# Patient Record
Sex: FEMALE | Race: WHITE
[De-identification: names, ages, dates, MRNs, and addresses within clinical notes are randomized per-mention and may not be internally consistent; named-entity substitution may affect disease eponyms.]

---
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
## 2019-03-26 NOTE — BEGINNING OF VISIT
26-Mar-2019 [Patient] : patient [FreeTextEntry1] : 19 yo here for well visit.Allergies are bad and have had some bad migraines

## 2019-05-27 ENCOUNTER — TRANSCRIPTION ENCOUNTER (OUTPATIENT)
Age: 19
End: 2019-05-27

## 2019-06-18 ENCOUNTER — APPOINTMENT (OUTPATIENT)
Dept: PEDIATRICS | Facility: CLINIC | Age: 19
End: 2019-06-18
Payer: COMMERCIAL

## 2019-06-18 VITALS — TEMPERATURE: 98.4 F | WEIGHT: 115 LBS

## 2019-06-18 DIAGNOSIS — Z87.09 PERSONAL HISTORY OF OTHER DISEASES OF THE RESPIRATORY SYSTEM: ICD-10-CM

## 2019-06-18 LAB — S PYO AG SPEC QL IA: NEGATIVE

## 2019-06-18 PROCEDURE — 99213 OFFICE O/P EST LOW 20 MIN: CPT

## 2019-06-18 PROCEDURE — 87880 STREP A ASSAY W/OPTIC: CPT | Mod: QW

## 2019-06-21 LAB — BACTERIA THROAT CULT: NORMAL

## 2019-07-03 ENCOUNTER — APPOINTMENT (OUTPATIENT)
Dept: PEDIATRICS | Facility: CLINIC | Age: 19
End: 2019-07-03
Payer: COMMERCIAL

## 2019-07-03 VITALS
DIASTOLIC BLOOD PRESSURE: 86 MMHG | WEIGHT: 117.5 LBS | SYSTOLIC BLOOD PRESSURE: 129 MMHG | HEIGHT: 64.25 IN | HEART RATE: 118 BPM | BODY MASS INDEX: 20.06 KG/M2

## 2019-07-03 PROCEDURE — 99395 PREV VISIT EST AGE 18-39: CPT | Mod: 25

## 2019-07-03 PROCEDURE — 92551 PURE TONE HEARING TEST AIR: CPT

## 2019-07-03 PROCEDURE — 96127 BRIEF EMOTIONAL/BEHAV ASSMT: CPT

## 2019-07-03 PROCEDURE — 96160 PT-FOCUSED HLTH RISK ASSMT: CPT | Mod: 59

## 2019-07-03 NOTE — PHYSICAL EXAM
[No Acute Distress] : no acute distress [Alert] : alert [Normocephalic] : normocephalic [EOMI Bilateral] : EOMI bilateral [Atraumatic] : atraumatic [Conjunctivae with no discharge] : conjunctivae with no discharge [PERRLA] : KARELY [Clear tympanic membranes with bony landmarks and light reflex present bilaterally] : clear tympanic membranes with bony landmarks and light reflex present bilaterally  [Auditory Canals Clear] : auditory canals clear [No Excess Tearing] : no excess tearing [No Discharge] : no discharge [Nares Patent] : nares patent [Pink Nasal Mucosa] : pink nasal mucosa [Nonerythematous Oropharynx] : nonerythematous oropharynx [No Caries] : no caries [Supple, full passive range of motion] : supple, full passive range of motion [Uvula Midline] : uvula midline [Palate Intact] : palate intact [No Palpable Masses] : no palpable masses [Symmetric Chest Rise] : symmetric chest rise [Clear to Ausculatation Bilaterally] : clear to auscultation bilaterally [Normoactive Precordium] : normoactive precordium [No Murmurs] : no murmurs [Regular Rate and Rhythm] : regular rate and rhythm [Normal S1, S2 audible] : normal S1, S2 audible [+2 Femoral Pulses] : +2 femoral pulses [Soft] : soft [NonTender] : non tender [Non Distended] : non distended [Normoactive Bowel Sounds] : normoactive bowel sounds [No Hepatomegaly] : no hepatomegaly [No Splenomegaly] : no splenomegaly [Normal Muscle Tone] : normal muscle tone [No Gait Asymmetry] : no gait asymmetry [No Abnormal Lymph Nodes Palpated] : no abnormal lymph nodes palpated [Straight] : straight [No pain or deformities with palpation of bone, muscles, joints] : no pain or deformities with palpation of bone, muscles, joints [No Scoliosis] : no scoliosis [Cranial Nerves Grossly Intact] : cranial nerves grossly intact [No Rash or Lesions] : no rash or lesions [de-identified] : Normal

## 2019-07-03 NOTE — HISTORY OF PRESENT ILLNESS
[Eats meals with family] : eats meals with family [Up to date] : Up to date [Normal] : normal [Has family members/adults to turn to for help] : has family members/adults to turn to for help [Is permitted and is able to make independent decisions] : Is permitted and is able to make independent decisions [Yes] : Patient has had sexual intercourse. [Always] : Condom use: always [HIV Screening Declined] : HIV Screening Declined [Displays self-confidence] : displays self-confidence [Has ways to cope with stress] : has ways to cope with stress [With Teen] : teen [Sleep Concerns] : no sleep concerns [Uses tobacco] : does not use tobacco [Exposure to electronic nicotine delivery system] : no exposure to electronic nicotine delivery system [Uses electronic nicotine delivery system] : does not use electronic nicotine delivery system [Exposure to tobacco] : no exposure to tobacco [Uses drugs] : does not use drugs  [Exposure to drugs] : no exposure to drugs [Drinks alcohol] : does not drink alcohol [Exposure to alcohol] : no exposure to alcohol [Gets depressed, anxious, or irritable/has mood swings] : does not get depressed, anxious, or irritable/has mood swings [Has thought about hurting self or considered suicide] : has not thought about hurting self or considered suicide [Has problems with sleep] : does not have problems with sleep

## 2020-01-03 ENCOUNTER — RX RENEWAL (OUTPATIENT)
Age: 20
End: 2020-01-03

## 2020-01-03 DIAGNOSIS — Z91.018 ALLERGY TO OTHER FOODS: ICD-10-CM

## 2020-06-27 ENCOUNTER — TRANSCRIPTION ENCOUNTER (OUTPATIENT)
Age: 20
End: 2020-06-27

## 2020-06-30 ENCOUNTER — APPOINTMENT (OUTPATIENT)
Dept: FAMILY MEDICINE | Facility: CLINIC | Age: 20
End: 2020-06-30
Payer: COMMERCIAL

## 2020-06-30 ENCOUNTER — LABORATORY RESULT (OUTPATIENT)
Age: 20
End: 2020-06-30

## 2020-06-30 VITALS
OXYGEN SATURATION: 99 % | WEIGHT: 120 LBS | DIASTOLIC BLOOD PRESSURE: 76 MMHG | HEART RATE: 113 BPM | BODY MASS INDEX: 20.49 KG/M2 | HEIGHT: 64 IN | SYSTOLIC BLOOD PRESSURE: 118 MMHG

## 2020-06-30 DIAGNOSIS — Z83.49 FAMILY HISTORY OF OTHER ENDOCRINE, NUTRITIONAL AND METABOLIC DISEASES: ICD-10-CM

## 2020-06-30 PROCEDURE — 99385 PREV VISIT NEW AGE 18-39: CPT | Mod: 25

## 2020-06-30 PROCEDURE — 36415 COLL VENOUS BLD VENIPUNCTURE: CPT

## 2020-06-30 RX ORDER — ACETAMINOPHEN, ASPIRIN, AND CAFFEINE 250; 250; 65 MG/1; MG/1; MG/1
250-250-65 TABLET, FILM COATED ORAL
Refills: 0 | Status: DISCONTINUED | COMMUNITY
End: 2020-06-30

## 2020-06-30 RX ORDER — NORETHINDRONE ACETATE AND ETHINYL ESTRADIOL AND FERROUS FUMARATE 1MG-20(21)
1-20 KIT ORAL
Qty: 28 | Refills: 0 | Status: DISCONTINUED | COMMUNITY
Start: 2018-03-27 | End: 2020-06-30

## 2020-06-30 RX ORDER — AMOXICILLIN 875 MG/1
875 TABLET, FILM COATED ORAL
Qty: 20 | Refills: 0 | Status: DISCONTINUED | COMMUNITY
Start: 2019-06-18 | End: 2020-06-30

## 2020-06-30 NOTE — HEALTH RISK ASSESSMENT
[Excellent] : ~his/her~  mood as  excellent [No falls in past year] : Patient reported no falls in the past year [Yes] : Yes [] : No [0] : 2) Feeling down, depressed, or hopeless: Not at all (0) [[id number]] : 0 [Patient reported PAP Smear was normal] : Patient reported PAP Smear was normal [With Family] : lives with family [Single] : single [Student] : student [PapSmearDate] : 2019 [Fully functional (using the telephone, shopping, preparing meals, housekeeping, doing laundry, using] : Fully functional and needs no help or supervision to perform IADLs (using the telephone, shopping, preparing meals, housekeeping, doing laundry, using transportation, managing medications and managing finances) [Fully functional (bathing, dressing, toileting, transferring, walking, feeding)] : Fully functional (bathing, dressing, toileting, transferring, walking, feeding) [de-identified] : [facility] and [facility]

## 2020-06-30 NOTE — HISTORY OF PRESENT ILLNESS
[de-identified] : 20 year old female here to establish care and for a full physical examination. The patients complete medical, family and social history was documented and reviewed with the patient.  The patients medications were identified and also reviewed with the patient as well as any allergies to any medications. All active and previous medical problems were identified and discussed with the patient.  Any new problems were documented. Patient is feeling well today.\par transitioning from pediatrician

## 2020-06-30 NOTE — PHYSICAL EXAM
[Well Nourished] : well nourished [Well Developed] : well developed [Clear to Auscultation] : lungs were clear to auscultation bilaterally [Normal Gait] : normal gait [Normal S1, S2] : normal S1 and S2 [Regular Rhythm] : with a regular rhythm [Normal Affect] : the affect was normal [Normal Insight/Judgement] : insight and judgment were intact

## 2020-06-30 NOTE — ASSESSMENT
[FreeTextEntry1] : Patient was counseled on healthy eating habits, on daily exercise and stress relief. All medications and allergies were reviewed with the patient and any adjustments necessary were made. Patient was counseled to try engage in an exercise activity for at least 30 minutes 3-4 times a week.  Patient was advised to eat a diet low in fat and carbohydrates and high in protein, with plenty of vegetables. Patient was advised to try and engage in relaxing activities whenever possible.\par The patients blood was draw in office and will be followed and assessed for any issues.  Patient was told to return to the office if any issues arise.  Unless otherwise stated, the patient is to continue all other medications as previously prescribed.\par \par left eye stye\par been present for two weeks\par optho

## 2020-07-01 LAB
ALBUMIN SERPL ELPH-MCNC: 4.5 G/DL
ALP BLD-CCNC: 84 U/L
ALT SERPL-CCNC: 12 U/L
ANION GAP SERPL CALC-SCNC: 13 MMOL/L
APPEARANCE: CLEAR
AST SERPL-CCNC: 20 U/L
BASOPHILS # BLD AUTO: 0.12 K/UL
BASOPHILS NFR BLD AUTO: 1.9 %
BILIRUB SERPL-MCNC: 0.2 MG/DL
BILIRUBIN URINE: NEGATIVE
BLOOD URINE: NEGATIVE
BUN SERPL-MCNC: 13 MG/DL
CALCIUM SERPL-MCNC: 9.3 MG/DL
CHLORIDE SERPL-SCNC: 107 MMOL/L
CHOLEST SERPL-MCNC: 152 MG/DL
CHOLEST/HDLC SERPL: 2.7 RATIO
CO2 SERPL-SCNC: 22 MMOL/L
COLOR: NORMAL
CREAT SERPL-MCNC: 0.74 MG/DL
EOSINOPHIL # BLD AUTO: 0.48 K/UL
EOSINOPHIL NFR BLD AUTO: 7.6 %
GLUCOSE QUALITATIVE U: NEGATIVE
GLUCOSE SERPL-MCNC: 93 MG/DL
HCT VFR BLD CALC: 41.6 %
HDLC SERPL-MCNC: 57 MG/DL
HGB BLD-MCNC: 13.2 G/DL
IMM GRANULOCYTES NFR BLD AUTO: 0.2 %
KETONES URINE: NEGATIVE
LDLC SERPL CALC-MCNC: 78 MG/DL
LEUKOCYTE ESTERASE URINE: NEGATIVE
LYMPHOCYTES # BLD AUTO: 2.27 K/UL
LYMPHOCYTES NFR BLD AUTO: 35.8 %
MAN DIFF?: NORMAL
MCHC RBC-ENTMCNC: 30.3 PG
MCHC RBC-ENTMCNC: 31.7 GM/DL
MCV RBC AUTO: 95.6 FL
MONOCYTES # BLD AUTO: 0.38 K/UL
MONOCYTES NFR BLD AUTO: 6 %
NEUTROPHILS # BLD AUTO: 3.08 K/UL
NEUTROPHILS NFR BLD AUTO: 48.5 %
NITRITE URINE: NEGATIVE
PH URINE: 6.5
PLATELET # BLD AUTO: 262 K/UL
POTASSIUM SERPL-SCNC: 4.2 MMOL/L
PROT SERPL-MCNC: 7 G/DL
PROTEIN URINE: NORMAL
RBC # BLD: 4.35 M/UL
RBC # FLD: 12.9 %
SODIUM SERPL-SCNC: 141 MMOL/L
SPECIFIC GRAVITY URINE: 1.02
TRIGL SERPL-MCNC: 83 MG/DL
TSH SERPL-ACNC: 4.69 UIU/ML
UROBILINOGEN URINE: NORMAL
WBC # FLD AUTO: 6.34 K/UL

## 2020-07-02 LAB
THYROGLOB AB SERPL-ACNC: <20 IU/ML
THYROPEROXIDASE AB SERPL IA-ACNC: 103 IU/ML

## 2020-12-21 ENCOUNTER — APPOINTMENT (OUTPATIENT)
Dept: ULTRASOUND IMAGING | Facility: CLINIC | Age: 20
End: 2020-12-21

## 2020-12-21 ENCOUNTER — APPOINTMENT (OUTPATIENT)
Dept: FAMILY MEDICINE | Facility: CLINIC | Age: 20
End: 2020-12-21
Payer: COMMERCIAL

## 2020-12-21 VITALS
WEIGHT: 125 LBS | HEIGHT: 64 IN | HEART RATE: 92 BPM | DIASTOLIC BLOOD PRESSURE: 70 MMHG | OXYGEN SATURATION: 99 % | BODY MASS INDEX: 21.34 KG/M2 | SYSTOLIC BLOOD PRESSURE: 110 MMHG

## 2020-12-21 DIAGNOSIS — H00.019 HORDEOLUM EXTERNUM UNSPECIFIED EYE, UNSPECIFIED EYELID: ICD-10-CM

## 2020-12-21 DIAGNOSIS — E07.9 DISORDER OF THYROID, UNSPECIFIED: ICD-10-CM

## 2020-12-21 PROBLEM — Z87.09 HISTORY OF SORE THROAT: Status: RESOLVED | Noted: 2019-06-18 | Resolved: 2020-12-21

## 2020-12-21 LAB — TSH SERPL-ACNC: 2.22 UIU/ML

## 2020-12-21 PROCEDURE — 99072 ADDL SUPL MATRL&STAF TM PHE: CPT

## 2020-12-21 PROCEDURE — 36415 COLL VENOUS BLD VENIPUNCTURE: CPT

## 2020-12-21 PROCEDURE — 99214 OFFICE O/P EST MOD 30 MIN: CPT | Mod: 25

## 2020-12-21 RX ORDER — EPINEPHRINE 0.15 MG/.3ML
0.15 INJECTION INTRAMUSCULAR
Refills: 0 | Status: DISCONTINUED | COMMUNITY
End: 2020-12-21

## 2020-12-21 NOTE — HEALTH RISK ASSESSMENT
[] : No [Yes] : Yes [No falls in past year] : Patient reported no falls in the past year [0] : 2) Feeling down, depressed, or hopeless: Not at all (0) [[id number]] : 0 [Excellent] : ~his/her~  mood as  excellent [Patient reported PAP Smear was normal] : Patient reported PAP Smear was normal [With Family] : lives with family [Student] : student [Single] : single [Fully functional (bathing, dressing, toileting, transferring, walking, feeding)] : Fully functional (bathing, dressing, toileting, transferring, walking, feeding) [Fully functional (using the telephone, shopping, preparing meals, housekeeping, doing laundry, using] : Fully functional and needs no help or supervision to perform IADLs (using the telephone, shopping, preparing meals, housekeeping, doing laundry, using transportation, managing medications and managing finances) [PapSmearDate] : 2019 [de-identified] : [facility] and [facility]

## 2020-12-21 NOTE — HISTORY OF PRESENT ILLNESS
[de-identified] : 20 year old female is here for a followup visit. Patient is here for medication renewals and for blood work discussion. Medications and allergies were reviewed and assessed.  There has been no new medications since the last visit. Patient is feeling well with no active changes or issues since Her last visit.\par \par transitioning from pediatrician

## 2020-12-21 NOTE — ASSESSMENT
[FreeTextEntry1] : thyroid disorder\par  Blood was drawn to assess levels of TSH and T4. \par tsh elevated last visit, reviewed labs from previous visit, will recheck\par current asymptomatic\par thyroid sonogram\par strong family history, mother and two sisters with thyroid dysfunction\par \par left eye stye\par been present for two weeks\par optho - gave cream, considering surgical removal

## 2020-12-22 LAB
THYROGLOB AB SERPL-ACNC: <20 IU/ML
THYROPEROXIDASE AB SERPL IA-ACNC: 139 IU/ML

## 2021-05-02 ENCOUNTER — TRANSCRIPTION ENCOUNTER (OUTPATIENT)
Age: 21
End: 2021-05-02

## 2021-07-30 RX ORDER — EPINEPHRINE 0.3 MG/.3ML
0.3 INJECTION INTRAMUSCULAR
Qty: 1 | Refills: 1 | Status: ACTIVE | COMMUNITY
Start: 2020-01-03 | End: 1900-01-01

## 2022-02-28 ENCOUNTER — NON-APPOINTMENT (OUTPATIENT)
Age: 22
End: 2022-02-28

## 2022-03-02 ENCOUNTER — APPOINTMENT (OUTPATIENT)
Dept: FAMILY MEDICINE | Facility: CLINIC | Age: 22
End: 2022-03-02
Payer: COMMERCIAL

## 2022-03-02 VITALS
OXYGEN SATURATION: 99 % | WEIGHT: 123 LBS | DIASTOLIC BLOOD PRESSURE: 76 MMHG | BODY MASS INDEX: 21 KG/M2 | SYSTOLIC BLOOD PRESSURE: 110 MMHG | HEIGHT: 64 IN | HEART RATE: 80 BPM

## 2022-03-02 LAB
ALBUMIN SERPL ELPH-MCNC: 4.5 G/DL
ALP BLD-CCNC: 92 U/L
ALT SERPL-CCNC: 14 U/L
ANION GAP SERPL CALC-SCNC: 12 MMOL/L
AST SERPL-CCNC: 19 U/L
BASOPHILS # BLD AUTO: 0.12 K/UL
BASOPHILS NFR BLD AUTO: 2.4 %
BILIRUB SERPL-MCNC: 0.4 MG/DL
BUN SERPL-MCNC: 11 MG/DL
CALCIUM SERPL-MCNC: 9.3 MG/DL
CHLORIDE SERPL-SCNC: 102 MMOL/L
CHOLEST SERPL-MCNC: 135 MG/DL
CO2 SERPL-SCNC: 22 MMOL/L
CREAT SERPL-MCNC: 0.74 MG/DL
EGFR: 117 ML/MIN/1.73M2
EOSINOPHIL # BLD AUTO: 0.29 K/UL
EOSINOPHIL NFR BLD AUTO: 5.9 %
ESTIMATED AVERAGE GLUCOSE: 100 MG/DL
GLUCOSE SERPL-MCNC: 91 MG/DL
HBA1C MFR BLD HPLC: 5.1 %
HCT VFR BLD CALC: 41 %
HDLC SERPL-MCNC: 55 MG/DL
HGB BLD-MCNC: 13.1 G/DL
IMM GRANULOCYTES NFR BLD AUTO: 0.2 %
LDLC SERPL CALC-MCNC: 67 MG/DL
LYMPHOCYTES # BLD AUTO: 1.86 K/UL
LYMPHOCYTES NFR BLD AUTO: 37.6 %
MAN DIFF?: NORMAL
MCHC RBC-ENTMCNC: 30.2 PG
MCHC RBC-ENTMCNC: 32 GM/DL
MCV RBC AUTO: 94.5 FL
MONOCYTES # BLD AUTO: 0.34 K/UL
MONOCYTES NFR BLD AUTO: 6.9 %
NEUTROPHILS # BLD AUTO: 2.33 K/UL
NEUTROPHILS NFR BLD AUTO: 47 %
NONHDLC SERPL-MCNC: 79 MG/DL
PLATELET # BLD AUTO: 254 K/UL
POTASSIUM SERPL-SCNC: 4.4 MMOL/L
PROT SERPL-MCNC: 7 G/DL
RBC # BLD: 4.34 M/UL
RBC # FLD: 13.2 %
SODIUM SERPL-SCNC: 136 MMOL/L
TRIGL SERPL-MCNC: 60 MG/DL
TSH SERPL-ACNC: 3.85 UIU/ML
WBC # FLD AUTO: 4.95 K/UL

## 2022-03-02 PROCEDURE — 36415 COLL VENOUS BLD VENIPUNCTURE: CPT

## 2022-03-02 PROCEDURE — 99395 PREV VISIT EST AGE 18-39: CPT | Mod: 25

## 2022-03-02 NOTE — ASSESSMENT
[FreeTextEntry1] : Patient was counseled on healthy eating habits, on daily exercise and stress relief. All medications and allergies were reviewed with the patient and any adjustments necessary were made. Patient was counseled to try engage in an exercise activity for at least 30 minutes 3-4 times a week.  Patient was advised to eat a diet low in fat and carbohydrates and high in protein, with plenty of vegetables. Patient was advised to try and engage in relaxing activities whenever possible.\par The patients blood was draw in office and will be followed and assessed for any issues.  Patient was told to return to the office if any issues arise.  Unless otherwise stated, the patient is to continue all other medications as previously prescribed.\par \par \par thyroid disorder\par  Blood was drawn to assess levels of TSH and T4. \par tsh elevated last visit, reviewed labs from previous visit, will recheck\par current asymptomatic\par thyroid sonogram\par strong family history, mother and two sisters with thyroid dysfunction\par \par

## 2022-03-02 NOTE — HEALTH RISK ASSESSMENT
[Excellent] : ~his/her~  mood as  excellent [Never] : Never [Yes] : Yes [No falls in past year] : Patient reported no falls in the past year [0] : 2) Feeling down, depressed, or hopeless: Not at all (0) [PHQ-2 Negative - No further assessment needed] : PHQ-2 Negative - No further assessment needed [[id number]] : 0 [With Family] : lives with family [Patient reported PAP Smear was normal] : Patient reported PAP Smear was normal [Employed] : employed [Single] : single [Fully functional (bathing, dressing, toileting, transferring, walking, feeding)] : Fully functional (bathing, dressing, toileting, transferring, walking, feeding) [Fully functional (using the telephone, shopping, preparing meals, housekeeping, doing laundry, using] : Fully functional and needs no help or supervision to perform IADLs (using the telephone, shopping, preparing meals, housekeeping, doing laundry, using transportation, managing medications and managing finances) [PapSmearDate] : 2019 [de-identified] : [employer] [facility] [place] [employer]

## 2022-03-02 NOTE — HISTORY OF PRESENT ILLNESS
[de-identified] : 22 year old female  here for annual well visit. Patient's blood work was drawn and medications reviewed. Patient's past medical history was reviewed, allergies verified and problems were identified and assessed. Patients medications were reviewed. Patient is feeling well with no new or active complaints at this time.\par \par

## 2022-03-03 LAB
THYROGLOB AB SERPL-ACNC: <20 IU/ML
THYROPEROXIDASE AB SERPL IA-ACNC: 105 IU/ML

## 2022-05-04 DIAGNOSIS — Z00.00 ENCOUNTER FOR GENERAL ADULT MEDICAL EXAMINATION W/OUT ABNORMAL FINDINGS: ICD-10-CM

## 2022-09-10 ENCOUNTER — NON-APPOINTMENT (OUTPATIENT)
Age: 22
End: 2022-09-10

## 2023-04-26 ENCOUNTER — NON-APPOINTMENT (OUTPATIENT)
Age: 23
End: 2023-04-26

## 2023-04-30 ENCOUNTER — NON-APPOINTMENT (OUTPATIENT)
Age: 23
End: 2023-04-30

## 2023-05-02 ENCOUNTER — RESULT REVIEW (OUTPATIENT)
Age: 23
End: 2023-05-02

## 2023-05-02 ENCOUNTER — APPOINTMENT (OUTPATIENT)
Dept: OTOLARYNGOLOGY | Facility: CLINIC | Age: 23
End: 2023-05-02
Payer: COMMERCIAL

## 2023-05-02 ENCOUNTER — NON-APPOINTMENT (OUTPATIENT)
Age: 23
End: 2023-05-02

## 2023-05-02 VITALS
BODY MASS INDEX: 20.49 KG/M2 | HEIGHT: 64 IN | DIASTOLIC BLOOD PRESSURE: 82 MMHG | SYSTOLIC BLOOD PRESSURE: 123 MMHG | WEIGHT: 120 LBS | HEART RATE: 84 BPM

## 2023-05-02 DIAGNOSIS — J34.2 DEVIATED NASAL SEPTUM: ICD-10-CM

## 2023-05-02 DIAGNOSIS — J30.1 ALLERGIC RHINITIS DUE TO POLLEN: ICD-10-CM

## 2023-05-02 PROCEDURE — 70486 CT MAXILLOFACIAL W/O DYE: CPT

## 2023-05-02 PROCEDURE — 99204 OFFICE O/P NEW MOD 45 MIN: CPT | Mod: 25

## 2023-05-02 PROCEDURE — 31231 NASAL ENDOSCOPY DX: CPT

## 2023-05-02 NOTE — PROCEDURE
[Recalcitrant Symptoms] : recalcitrant symptoms  [FreeTextEntry6] : Flexible Nasal Endoscopy:\par Right Side:\par -middle meatus looks clear\par -no drainage, discharge, or polyps\par Left Side:\par -middle meatus looks clear\par -no polyps or growths\par -nasopharynx looks clear

## 2023-05-02 NOTE — PHYSICAL EXAM
[Midline] : trachea located in midline position [Normal] : no rashes [FreeTextEntry1] : -septums to the right along the floor but not obstructive  [de-identified] : jayne sit on BOT [de-identified] : class 1 [FreeTextEntry2] : sensations intact. sinuses nontender to percussion

## 2023-05-02 NOTE — REVIEW OF SYSTEMS
[Sneezing] : sneezing [Seasonal Allergies] : seasonal allergies [Post Nasal Drip] : post nasal drip [Nasal Congestion] : nasal congestion [Sense Of Smell Problem] : sense of smell problem [Discolored Nasal Discharge] : discolored nasal discharge [Throat Clearing] : throat clearing [Swelling Neck] : swelling neck [Eyes Itch] : itching of the eyes [Negative] : Heme/Lymph

## 2023-05-02 NOTE — CONSULT LETTER
[Dear  ___] : Dear  [unfilled], [Consult Letter:] : I had the pleasure of evaluating your patient, [unfilled]. [Please see my note below.] : Please see my note below. [Consult Closing:] : Thank you very much for allowing me to participate in the care of this patient.  If you have any questions, please do not hesitate to contact me. [Sincerely,] : Sincerely, [FreeTextEntry1] : Shawn Greenberg MD FACS

## 2023-05-02 NOTE — HISTORY OF PRESENT ILLNESS
[de-identified] : Patient presents stating she has had congestion starting in February 2022. She went to Urgent Care and they didn’t know what was going on so they gave her ABX which helped, but then after finishing the ABX her symptoms came back. She has stuffy nose, clearing of the throat, and puffy eyes. She states she has been living like this since. She states it affects her sleep and when she blows her nose nothing comes out. She states she has been using Flonase, Singulair, and Zyrtec. Patient adds she did get Covid in February 2022 and that was the only time she had it.

## 2023-05-02 NOTE — ASSESSMENT
[FreeTextEntry1] : Reviewed and reconciled medications, allergies, PMHx, PSHx, SocHx, FMHx \par \par Patient presents stating she has had congestion starting in February 2022. She went to Urgent Care and they didn’t know what was going on so they gave her ABX which helped, but then after finishing the ABX her symptoms came back. She has stuffy nose, clearing of the throat, and puffy eyes. She states she has been living like this since. She states it affects her sleep and when she blows her nose nothing comes out. She states she has been using Flonase, Singulair, and Zyrtec. Patient adds she did get Covid in February 2022 and that was the only time she had it.\par \par Physical Exam:\par -tonsils class 1\par -uvula sits on BOT\par -septums to the right along the floor but not obstructive \par \par Flexible Nasal Endoscopy:\par Right Side:\par -middle meatus looks clear\par -no drainage, discharge, or polyps\par Left Side:\par -middle meatus looks clear\par -no polyps or growths\par -nasopharynx looks clear\par \par MiniCat Sinus:\par -polyp in the maxillaries bilaterally \par -thickening of the membranes with mucus\par -ethmoid disease\par -left sphenoid disease\par -frontals look okay but drain pathway a little blocked\par -marco antonio bullosa left middle turbinate\par \par Plan: Flexible Nasal Endoscopy. Results from MiniCAT sent off to radiologist for review. Will FU once we get results back. Continue using the spray for now. Referral to Dr. Chau.

## 2023-06-08 ENCOUNTER — APPOINTMENT (OUTPATIENT)
Dept: OTOLARYNGOLOGY | Facility: AMBULATORY MEDICAL SERVICES | Age: 23
End: 2023-06-08
Payer: COMMERCIAL

## 2023-06-08 ENCOUNTER — RESULT REVIEW (OUTPATIENT)
Age: 23
End: 2023-06-08

## 2023-06-08 DIAGNOSIS — G89.18 OTHER ACUTE POSTPROCEDURAL PAIN: ICD-10-CM

## 2023-06-08 PROCEDURE — 30140 RESECT INFERIOR TURBINATE: CPT | Mod: 50

## 2023-06-08 PROCEDURE — 31295 NSL/SINS NDSC SURG MAX SINS: CPT | Mod: 50

## 2023-06-08 PROCEDURE — 31255 NSL/SINS NDSC W/TOT ETHMDCT: CPT | Mod: 50

## 2023-06-08 PROCEDURE — 30520 REPAIR OF NASAL SEPTUM: CPT

## 2023-06-08 PROCEDURE — 31298 NSL/SINS NDSC SURG FRNT&SPHN: CPT | Mod: 50

## 2023-06-19 ENCOUNTER — APPOINTMENT (OUTPATIENT)
Dept: OTOLARYNGOLOGY | Facility: CLINIC | Age: 23
End: 2023-06-19
Payer: COMMERCIAL

## 2023-06-19 VITALS
BODY MASS INDEX: 21.34 KG/M2 | HEIGHT: 64 IN | WEIGHT: 125 LBS | DIASTOLIC BLOOD PRESSURE: 86 MMHG | HEART RATE: 96 BPM | SYSTOLIC BLOOD PRESSURE: 132 MMHG

## 2023-06-19 PROCEDURE — 99024 POSTOP FOLLOW-UP VISIT: CPT

## 2023-06-19 NOTE — CONSULT LETTER
[Dear  ___] : Dear  [unfilled], [Courtesy Letter:] : I had the pleasure of seeing your patient, [unfilled], in my office today. [Please see my note below.] : Please see my note below. [Referral Closing:] : Thank you very much for seeing this patient.  If you have any questions, please do not hesitate to contact me. [Sincerely,] : Sincerely, [FreeTextEntry3] : Oswald Matta PA-C\par

## 2023-06-19 NOTE — PROCEDURE
[FreeTextEntry1] : Packing removal [FreeTextEntry2] : Post op [FreeTextEntry3] : Procedure: Nasal Packing removal: Risks, benefits, and alternatives were explained to the patient.  The patient gave oral consent to proceed.  Nasopharynx with packing, blood, and mucus which were suctioned clear bilaterally. The patient tolerated the procedure well and states she feels like she can breathe a lot better after procedure.\par

## 2023-06-19 NOTE — HISTORY OF PRESENT ILLNESS
[de-identified] : Patient presents to office s/p bilateral endoscopic frontal sinusotomy with balloon dilatation, a bilateral\par sphenoidotomy with balloon dilatation, a bilateral endoscopic maxillary antrostomy with balloon dilatation, removal of contents, bilateral endoscopic total ethmoidectomy, all done with the [employer] tracking system, a septal reconstruction and a bilateral smr and partial inferior turbinectomy done on 6/8/23. Patient states she finished antibiotics, no longer on pain medications, and has been using saline sprays.

## 2023-06-19 NOTE — ASSESSMENT
[FreeTextEntry1] : Reviewed and reconciled medications, allergies, PMHx, PSHx, SocHx, FMHx.\par \par \par physical exam:\par nasally: packing, blood, and mucus in place\par \par Procedure: Nasal Packing removal: Risks, benefits, and alternatives were explained to the patient.  The patient gave oral consent to proceed.  Nasopharynx with packing, blood, and mucus which were suctioned clear bilaterally. The patient tolerated the procedure well and states she feels like she can breathe a lot better after procedure.\par \par Path:\par Specimen(s) Submitted\par 1  Right ethmoid\par 2  Left ethmoid\par 3  Bilateral turbinates\par 4  Nasal septum, bone and cartilage\par \par Final Diagnosis\par \par \par 1.  Paranasal sinus, right ethmoid, endoscopic sinus surgery\par - Chronic sinusitis and inflammatory polyp\par \par 2.  Paranasal sinus, left ethmoid, endoscopic sinus surgery\par - Chronic sinusitis with tiny fragments of inflammatory polyp\par \par 3.  Nasal cavity, bilateral turbinates, excision\par - Hypertrophic turbinate tissue with chronic inflammation\par \par 4.  Nasal cavity, septum bone and cartilage, septoplasty\par - Cartilage and bone\par \par Plan:\par Follow up in 2 weeks\par Sinus rinse - 1 bottle per day\par \par

## 2023-06-25 ENCOUNTER — APPOINTMENT (OUTPATIENT)
Dept: FAMILY MEDICINE | Facility: CLINIC | Age: 23
End: 2023-06-25

## 2023-07-03 ENCOUNTER — APPOINTMENT (OUTPATIENT)
Dept: OTOLARYNGOLOGY | Facility: CLINIC | Age: 23
End: 2023-07-03
Payer: COMMERCIAL

## 2023-07-03 VITALS
DIASTOLIC BLOOD PRESSURE: 84 MMHG | HEART RATE: 84 BPM | BODY MASS INDEX: 21.34 KG/M2 | WEIGHT: 125 LBS | HEIGHT: 64 IN | SYSTOLIC BLOOD PRESSURE: 129 MMHG

## 2023-07-03 DIAGNOSIS — J34.89 OTHER SPECIFIED DISORDERS OF NOSE AND NASAL SINUSES: ICD-10-CM

## 2023-07-03 PROCEDURE — 99024 POSTOP FOLLOW-UP VISIT: CPT

## 2023-07-03 PROCEDURE — 31231 NASAL ENDOSCOPY DX: CPT

## 2023-07-03 NOTE — ASSESSMENT
[FreeTextEntry1] : Reviewed and reconciled medications, allergies, PMHx, PSHx, SocHx, FMHx.\par \par \par physical exam:\par nasally: packing, blood, and mucus in place\par \par Procedure:  Rigid Nasal Endoscopy w/ Bilateral Sinus Debridement: Risks, benefits, and alternatives of rigid endoscopy were explained to the patient.  The patient gave oral consent to proceed. The 0 degree scope was inserted into the right nasal cavity.  Endoscopy of the inferior and middle meatus was performed. Spheno-ethmoid recess clear.  Nasopharynx was clear.  Turbinates were without mass.The procedure was repeated on the contralateral side with similar findings. Middle meatus and sinus debridement done bilaterally. The patient tolerated the procedure well.\par \par \par Path:\par Specimen(s) Submitted\par 1 Right ethmoid\par 2 Left ethmoid\par 3 Bilateral turbinates\par 4 Nasal septum, bone and cartilage\par \par Final Diagnosis\par \par \par 1. Paranasal sinus, right ethmoid, endoscopic sinus surgery\par - Chronic sinusitis and inflammatory polyp\par \par 2. Paranasal sinus, left ethmoid, endoscopic sinus surgery\par - Chronic sinusitis with tiny fragments of inflammatory polyp\par \par 3. Nasal cavity, bilateral turbinates, excision\par - Hypertrophic turbinate tissue with chronic inflammation\par \par 4. Nasal cavity, septum bone and cartilage, septoplasty\par - Cartilage and bone\par \par Plan:\par Follow up in 4 weeks\par Sinus rinse - 1 bottle per day

## 2023-07-03 NOTE — HISTORY OF PRESENT ILLNESS
[de-identified] : Patient presents to office s/p bilateral endoscopic frontal sinusotomy with balloon dilatation, a bilateral\par sphenoidotomy with balloon dilatation, a bilateral endoscopic maxillary antrostomy with balloon dilatation, removal of contents, bilateral endoscopic total ethmoidectomy, all done with the [employer] GPS tracking system, a septal reconstruction and a bilateral smr and partial inferior turbinectomy done on 6/8/23. Patient states she has been doing well. Has been using the sinus rinse and contents keep coming out but not as much as before.

## 2023-07-03 NOTE — PROCEDURE
[FreeTextEntry1] :  Rigid Nasal Endoscopy w/ Bilateral Sinus Debridement [FreeTextEntry2] : post op [FreeTextEntry3] : Procedure:  Rigid Nasal Endoscopy w/ Bilateral Sinus Debridement: Risks, benefits, and alternatives of rigid endoscopy were explained to the patient.  The patient gave oral consent to proceed. The 0 degree scope was inserted into the right nasal cavity.  Endoscopy of the inferior and middle meatus was performed. Spheno-ethmoid recess clear.  Nasopharynx was clear.  Turbinates were without mass.The procedure was repeated on the contralateral side with similar findings. Middle meatus and sinus debridement done bilaterally. The patient tolerated the procedure well.\par

## 2023-08-04 ENCOUNTER — APPOINTMENT (OUTPATIENT)
Dept: OTOLARYNGOLOGY | Facility: CLINIC | Age: 23
End: 2023-08-04
Payer: COMMERCIAL

## 2023-08-04 VITALS
BODY MASS INDEX: 21.34 KG/M2 | DIASTOLIC BLOOD PRESSURE: 82 MMHG | HEART RATE: 79 BPM | SYSTOLIC BLOOD PRESSURE: 120 MMHG | WEIGHT: 125 LBS | HEIGHT: 64 IN

## 2023-08-04 DIAGNOSIS — J33.9 NASAL POLYP, UNSPECIFIED: ICD-10-CM

## 2023-08-04 DIAGNOSIS — Z98.890 OTHER SPECIFIED POSTPROCEDURAL STATES: ICD-10-CM

## 2023-08-04 PROCEDURE — 31231 NASAL ENDOSCOPY DX: CPT | Mod: 78

## 2023-08-04 PROCEDURE — 99213 OFFICE O/P EST LOW 20 MIN: CPT | Mod: 25

## 2023-08-04 RX ORDER — BUDESONIDE 1 MG/2ML
1 INHALANT ORAL
Qty: 90 | Refills: 3 | Status: ACTIVE | COMMUNITY
Start: 2023-08-04 | End: 1900-01-01

## 2023-08-04 RX ORDER — ACETAMINOPHEN AND CODEINE PHOSPHATE 300; 30 MG/1; MG/1
300-30 TABLET ORAL
Qty: 30 | Refills: 0 | Status: DISCONTINUED | COMMUNITY
Start: 2023-06-08 | End: 2023-08-04

## 2023-08-04 RX ORDER — CEPHALEXIN 500 MG/1
500 TABLET ORAL
Qty: 20 | Refills: 0 | Status: DISCONTINUED | COMMUNITY
Start: 2023-06-08 | End: 2023-08-04

## 2023-08-04 NOTE — ASSESSMENT
[FreeTextEntry1] : Reviewed and Reconciled the pmhx, fmhx, sochx, and medhx  Patient presents to office s/p bilateral endoscopic frontal sinusotomy with balloon dilatation, a bilateral sphenoidotomy with balloon dilatation, a bilateral endoscopic maxillary antrostomy with balloon dilatation, removal of contents, bilateral endoscopic total ethmoidectomy, all done with the [place] GPS tracking system, a septal reconstruction and a bilateral smr and partial inferior turbinectomy done on 6/8/23. Patient states breathing and sinuses good. She continues sinus rinse. Not using any nasal sprays.  Physical Exam: Pathology benign, inflammatory polyp, chronic inflammation Tonsils Type 1 no drip  Nasal endoscopy, rigid sinuses open clear no debris or residual packing b/l   Plan:  Pulmicort prescribed. FU 6 mo

## 2023-08-04 NOTE — PHYSICAL EXAM
[FreeTextEntry1] : sensations intact sinuses nontender to percussion  [Midline] : trachea located in midline position [de-identified] : Tonsils Type 1  [Normal] : orientation to person, place, and time: normal

## 2023-08-04 NOTE — ADDENDUM
[FreeTextEntry1] : Documented by Mi Glaser acting as a scribe for Dr. Greenberg on [mm//dd/yyyy].   All Medical record entries made by the scribe were at my, Dr. Greenberg, direction and personally directed by me on 08/04/2023. I have reviewed the chart and agree that the record accurately reflects my personal performance of the history, physical exam, assessment, and plan. I have also personally directed, reviewed, and agreed with the discharge instructions.

## 2023-08-04 NOTE — PROCEDURE
[Post-Op Patency] : post-op patency [None] : none [FreeTextEntry6] : Nasal endoscopy, rigid sinuses open clear no debris or residual packing b/l

## 2023-08-04 NOTE — HISTORY OF PRESENT ILLNESS
[de-identified] :  Patient presents to office s/p bilateral endoscopic frontal sinusotomy with balloon dilatation, a bilateral sphenoidotomy with balloon dilatation, a bilateral endoscopic maxillary antrostomy with balloon dilatation, removal of contents, bilateral endoscopic total ethmoidectomy, all done with the [place] GPS tracking system, a septal reconstruction and a bilateral smr and partial inferior turbinectomy done on 6/8/23. Patient states breathing and sinuses good. She continues sinus rinse. Not using any nasal sprays.

## 2023-08-04 NOTE — CONSULT LETTER
[Dear  ___] : Dear  [unfilled], [Courtesy Letter:] : I had the pleasure of seeing your patient, [unfilled], in my office today. [Please see my note below.] : Please see my note below. [Consult Closing:] : Thank you very much for allowing me to participate in the care of this patient.  If you have any questions, please do not hesitate to contact me. [Sincerely,] : Sincerely, [FreeTextEntry3] : Shawn Greenberg MD FACS

## 2023-08-08 RX ORDER — FLUTICASONE PROPIONATE 93 UG/1
93 SPRAY, METERED NASAL
Qty: 3 | Refills: 3 | Status: ACTIVE | COMMUNITY
Start: 2023-08-08 | End: 1900-01-01

## 2024-03-13 ENCOUNTER — APPOINTMENT (OUTPATIENT)
Dept: OTOLARYNGOLOGY | Facility: CLINIC | Age: 24
End: 2024-03-13
Payer: COMMERCIAL

## 2024-03-13 VITALS
WEIGHT: 125 LBS | HEART RATE: 78 BPM | SYSTOLIC BLOOD PRESSURE: 124 MMHG | BODY MASS INDEX: 21.34 KG/M2 | HEIGHT: 64 IN | DIASTOLIC BLOOD PRESSURE: 86 MMHG

## 2024-03-13 DIAGNOSIS — R09.81 NASAL CONGESTION: ICD-10-CM

## 2024-03-13 DIAGNOSIS — J01.00 ACUTE MAXILLARY SINUSITIS, UNSPECIFIED: ICD-10-CM

## 2024-03-13 DIAGNOSIS — J31.0 CHRONIC RHINITIS: ICD-10-CM

## 2024-03-13 DIAGNOSIS — R04.0 EPISTAXIS: ICD-10-CM

## 2024-03-13 DIAGNOSIS — J34.89 NASAL CONGESTION: ICD-10-CM

## 2024-03-13 DIAGNOSIS — J32.4 CHRONIC PANSINUSITIS: ICD-10-CM

## 2024-03-13 PROCEDURE — 31231 NASAL ENDOSCOPY DX: CPT

## 2024-03-13 PROCEDURE — 99213 OFFICE O/P EST LOW 20 MIN: CPT | Mod: 25

## 2024-03-13 RX ORDER — AMOXICILLIN AND CLAVULANATE POTASSIUM 500; 125 MG/1; MG/1
500-125 TABLET, FILM COATED ORAL
Qty: 20 | Refills: 0 | Status: ACTIVE | COMMUNITY
Start: 2024-03-13 | End: 1900-01-01

## 2024-03-13 NOTE — ASSESSMENT
[FreeTextEntry1] :  Reviewed and reconciled medications, allergies, PMHx, PSHx, SocHx, FMHx.  Pt with h/o nasal congestion and s/p bilateral endoscopic frontal sinusotomy with balloon dilatation, a bilateral sphenoidotomy with balloon dilatation, a bilateral endoscopic maxillary antrostomy with balloon dilatation, removal of contents, bilateral endoscopic total ethmoidectomy, all done with the [employer] GPS tracking system, a septal reconstruction and a bilateral smr and partial inferior turbinectomy done on 6/8/23 presents today for a follow up. Pt states that she is doing well. pt states that sometimes after using sinus rinse or when she blows her nose she will see blood from her nose. Pt states that she has been using Xhance. Pt states that there was discolored mucus when she used her sinus rinse this morning.  Pathology 6/8/23: -inflammatory polyps -chronic inflammation -benign  Physical exam: -right ear: minimal cerumen removed via curettage -no lateralization to tuning forks -nasally wide open and clear -septum straight -class 1 tonsils  ENT Procedure:  Flexible nasal endoscopy -middle meatus clear bilaterally -no polyps or growths -yellow mucus  Plan: -Continue sinus rinse -Continue Xhance - 2 sprays BID, spray laterally -Start Augmentin BID with food -FU in 6 months

## 2024-03-13 NOTE — REASON FOR VISIT
[Subsequent Evaluation] : a subsequent evaluation for [FreeTextEntry2] : nose- nasal septoplasty, polypectomy  done in june 8th

## 2024-03-13 NOTE — PHYSICAL EXAM
[Hearing [place] Test (Tuning Fork On Forehead)] : no lateralization of tone [Midline] : trachea located in midline position [Normal] : orientation to person, place, and time: normal [Hearing Loss Right Only] : normal [Hearing Loss Left Only] : normal [FreeTextEntry8] : minimal cerumen removed via curettage [de-identified] : class 1 [FreeTextEntry2] :  sinuses nontender to percussion.  sensations intact

## 2024-03-13 NOTE — HISTORY OF PRESENT ILLNESS
[de-identified] : Pt with h/o nasal congestion and s/p bilateral endoscopic frontal sinusotomy with balloon dilatation, a bilateral sphenoidotomy with balloon dilatation, a bilateral endoscopic maxillary antrostomy with balloon dilatation, removal of contents, bilateral endoscopic total ethmoidectomy, all done with the [place] GPS tracking system, a septal reconstruction and a bilateral smr and partial inferior turbinectomy done on 6/8/23 presents today for a follow up. Pt states that she is doing well. pt states that sometimes after using sinus rinse or when she blows her nose she will see blood from her nose. Pt states that she has been using Xhance. Pt states that there was discolored mucus when she used her sinus rinse this morning.

## 2024-03-13 NOTE — PROCEDURE
[Post-Op Patency] : post-op patency [FreeTextEntry6] :  Procedure: Flexible Nasal Endoscopy: Risks, benefits, and alternatives of flexible endoscopy were explained to the patient. The patient gave oral consent to proceed. The flexible scope was inserted into the right nasal cavity. Endoscopy of the inferior and middle meatus was performed. No polyp, mass, or lesion was appreciated. Olfactory cleft was clear. Spheno-ethmoid recess is clear. Nasopharynx was clear. Turbinates were without mass. The procedure was repeated on the contralateral side with similar findings. Suction was used to remove mucus from bilateral nasal cavities. -middle meatus clear bilaterally -no polyps or growths -yellow mucus

## 2024-03-13 NOTE — ADDENDUM
[FreeTextEntry1] :  Documented by Mayito Rosenberg acting as scribe for Dr. Greenberg on 03/13/2024. All Medical record entries made by the Scribe were at my, Dr. Greenberg, direction and personally dictated by me on 03/13/2024 . I have reviewed the chart and agree that the record accurately reflects my personal performance of the history, physical exam, assessment and plan. I have also personally directed, reviewed, and agreed with the discharge instructions.

## 2024-09-18 ENCOUNTER — APPOINTMENT (OUTPATIENT)
Dept: OTOLARYNGOLOGY | Facility: CLINIC | Age: 24
End: 2024-09-18
Payer: COMMERCIAL

## 2024-09-18 VITALS
WEIGHT: 125 LBS | DIASTOLIC BLOOD PRESSURE: 81 MMHG | HEIGHT: 64 IN | BODY MASS INDEX: 21.34 KG/M2 | SYSTOLIC BLOOD PRESSURE: 126 MMHG | HEART RATE: 64 BPM

## 2024-09-18 DIAGNOSIS — J31.0 CHRONIC RHINITIS: ICD-10-CM

## 2024-09-18 DIAGNOSIS — J33.9 NASAL POLYP, UNSPECIFIED: ICD-10-CM

## 2024-09-18 DIAGNOSIS — K21.9 GASTRO-ESOPHAGEAL REFLUX DISEASE W/OUT ESOPHAGITIS: ICD-10-CM

## 2024-09-18 DIAGNOSIS — R09.81 NASAL CONGESTION: ICD-10-CM

## 2024-09-18 DIAGNOSIS — E04.2 NONTOXIC MULTINODULAR GOITER: ICD-10-CM

## 2024-09-18 DIAGNOSIS — E06.3 AUTOIMMUNE THYROIDITIS: ICD-10-CM

## 2024-09-18 DIAGNOSIS — R49.8 OTHER VOICE AND RESONANCE DISORDERS: ICD-10-CM

## 2024-09-18 DIAGNOSIS — J34.89 NASAL CONGESTION: ICD-10-CM

## 2024-09-18 DIAGNOSIS — J38.4 EDEMA OF LARYNX: ICD-10-CM

## 2024-09-18 PROCEDURE — 31231 NASAL ENDOSCOPY DX: CPT | Mod: 52

## 2024-09-18 PROCEDURE — 99214 OFFICE O/P EST MOD 30 MIN: CPT | Mod: 25

## 2024-09-18 RX ORDER — FAMOTIDINE 20 MG/1
20 TABLET, FILM COATED ORAL
Qty: 30 | Refills: 5 | Status: ACTIVE | COMMUNITY
Start: 2024-09-18 | End: 1900-01-01

## 2024-09-18 NOTE — ASSESSMENT
[FreeTextEntry1] :  Reviewed and reconciled medications, allergies, PMHx, PSHx, SocHx, FMHx.  Pt with h/o nasal congestion and s/p bilateral endoscopic frontal sinusotomy with balloon dilatation, a bilateral sphenoidotomy with balloon dilatation, a bilateral endoscopic maxillary antrostomy with balloon dilatation, removal of contents, bilateral endoscopic total ethmoidectomy, all done with the [employer] GPS tracking system, a septal reconstruction and a bilateral smr and partial inferior turbinectomy done on 6/8/23 presents today for a follow up. She states that she has been using Xhance, but she needs a refill. She denies using Budesonide. She states that she has Hashimoto's thyroiditis and there were 2 thyroid nodules revealed on a recent US. She states that she has noticed that her voice feels strained by the end of the day. She states that she plans on getting her thyroid nodules biopsied.  Pathology 6/8/23: -inflammatory polyps   Physical exam: -ears are clean and clear bilaterally -septum straight -mildly inflamed turbinates -tiny thyroid nodules  ENT Procedure:  Flexible Nasal Endoscopy with a Look at the Larynx -middle meatus clear bilaterally -sinus ostium open -no polyps -clear to the nasopharynx -BOT normal -epiglottis normal -edema of the postcricoid, arytenoids and interarytenoids. -tiny nodularity of the vocal cords   Plan: -Continue Xhance - 2 sprays BID, spray laterally -Start Reflux diet- no eating 2 hours before bed -Start Famotidine at bedtime and as needed when deviating from reflux diet -Ordered FEES and Videostrobe -FU with results from FEES and Videostrobe

## 2024-09-18 NOTE — PROCEDURE
[Recalcitrant Symptoms] : recalcitrant symptoms  [FreeTextEntry6] :  Procedure: Flexible Nasal Endoscopy with a Look at the Larynx: Risks, benefits, and alternatives of flexible endoscopy were explained to the patient. The patient gave oral consent to proceed. The flexible scope was inserted into the right nasal cavity. Endoscopy of the inferior and middle meatus was performed. No polyp, mass, or lesion was appreciated. Olfactory cleft was clear. Spheno-ethmoid recess clear. Nasopharynx was clear. Turbinates were without mass. Oropharyngeal walls were symmetric and mobile without lesion, mass, or edema. Hypopharynx was also without lesion or edema. Larynx was mobile without lesions. Supraglottic structures were free of mass and asymmetry, but edema of the postcricoid, arytenoids and interarytenoids. True vocal folds were white without mass or lesion, but there was tiny nodularity of the vocal cords. Base of tongue was within normal limits. The patient tolerated the procedure well -middle meatus clear bilaterally -sinus ostium open -no polyps -clear to the nasopharynx -BOT normal -epiglottis normal -edema of the postcricoid, arytenoids and interarytenoids. -tiny nodularity of the vocal cords

## 2024-09-18 NOTE — ADDENDUM
[FreeTextEntry1] :  Documented by Mayito Rosenberg acting as scribe for Dr. Greenberg on 09/18/2024. All Medical record entries made by the Scribe were at my, Dr. Greenberg, direction and personally dictated by me on 09/18/2024 . I have reviewed the chart and agree that the record accurately reflects my personal performance of the history, physical exam, assessment and plan. I have also personally directed, reviewed, and agreed with the discharge instructions.

## 2024-09-18 NOTE — PHYSICAL EXAM
[Midline] : trachea located in midline position [Normal] : no rashes [de-identified] : tiny thyroid nodules [Hearing Loss Right Only] : normal [Hearing Loss Left Only] : normal [de-identified] :  mildly inflamed turbinates [FreeTextEntry2] :  sinuses nontender to percussion

## 2024-09-18 NOTE — HISTORY OF PRESENT ILLNESS
[de-identified] : Pt with h/o nasal congestion and s/p bilateral endoscopic frontal sinusotomy with balloon dilatation, a bilateral sphenoidotomy with balloon dilatation, a bilateral endoscopic maxillary antrostomy with balloon dilatation, removal of contents, bilateral endoscopic total ethmoidectomy, all done with the [place] GPS tracking system, a septal reconstruction and a bilateral smr and partial inferior turbinectomy done on 6/8/23 presents today for a follow up. She states that she has been using Xhance, but she needs a refill. She denies using Budesonide. She states that she has Hashimoto's thyroiditis and there were 2 thyroid nodules revealed on a recent US. She states that she has noticed that her voice feels strained by the end of the day. She states that she plans on getting her thyroid nodules biopsied.

## 2024-10-14 ENCOUNTER — APPOINTMENT (OUTPATIENT)
Dept: OTOLARYNGOLOGY | Facility: CLINIC | Age: 24
End: 2024-10-14

## 2024-10-16 ENCOUNTER — APPOINTMENT (OUTPATIENT)
Dept: OTOLARYNGOLOGY | Facility: CLINIC | Age: 24
End: 2024-10-16

## 2024-10-21 RX ORDER — FLUTICASONE PROPIONATE 50 UG/1
50 SPRAY, METERED NASAL
Qty: 1 | Refills: 3 | Status: ACTIVE | COMMUNITY
Start: 2024-10-21 | End: 1900-01-01

## 2025-05-28 ENCOUNTER — APPOINTMENT (OUTPATIENT)
Dept: FAMILY MEDICINE | Facility: CLINIC | Age: 25
End: 2025-05-28